# Patient Record
Sex: MALE | Race: BLACK OR AFRICAN AMERICAN
[De-identification: names, ages, dates, MRNs, and addresses within clinical notes are randomized per-mention and may not be internally consistent; named-entity substitution may affect disease eponyms.]

---

## 2021-01-25 ENCOUNTER — HOSPITAL ENCOUNTER (OUTPATIENT)
Dept: HOSPITAL 62 - OD | Age: 41
End: 2021-01-25
Payer: COMMERCIAL

## 2021-01-25 DIAGNOSIS — M16.11: ICD-10-CM

## 2021-01-25 DIAGNOSIS — Z87.891: ICD-10-CM

## 2021-01-25 DIAGNOSIS — Z01.810: ICD-10-CM

## 2021-01-25 DIAGNOSIS — Z01.812: Primary | ICD-10-CM

## 2021-01-25 DIAGNOSIS — Z79.899: ICD-10-CM

## 2021-01-25 DIAGNOSIS — Z01.811: ICD-10-CM

## 2021-01-25 LAB
ADD MANUAL DIFF: NO
ANION GAP SERPL CALC-SCNC: 6 MMOL/L (ref 5–19)
APPEARANCE UR: CLEAR
APTT PPP: YELLOW S
BASOPHILS # BLD AUTO: 0 10^3/UL (ref 0–0.2)
BASOPHILS NFR BLD AUTO: 0.5 % (ref 0–2)
BILIRUB UR QL STRIP: NEGATIVE
BUN SERPL-MCNC: 15 MG/DL (ref 7–20)
CALCIUM: 9.2 MG/DL (ref 8.4–10.2)
CHLORIDE SERPL-SCNC: 104 MMOL/L (ref 98–107)
CO2 SERPL-SCNC: 29 MMOL/L (ref 22–30)
EOSINOPHIL # BLD AUTO: 0.1 10^3/UL (ref 0–0.6)
EOSINOPHIL NFR BLD AUTO: 1.1 % (ref 0–6)
ERYTHROCYTE [DISTWIDTH] IN BLOOD BY AUTOMATED COUNT: 12.7 % (ref 11.5–14)
ERYTHROCYTE [SEDIMENTATION RATE] IN BLOOD: 24 MM/HR (ref 0–15)
GLUCOSE SERPL-MCNC: 108 MG/DL (ref 75–110)
GLUCOSE UR STRIP-MCNC: NEGATIVE MG/DL
HCT VFR BLD CALC: 38.9 % (ref 37.9–51)
HGB BLD-MCNC: 13.1 G/DL (ref 13.5–17)
KETONES UR STRIP-MCNC: NEGATIVE MG/DL
LYMPHOCYTES # BLD AUTO: 2 10^3/UL (ref 0.5–4.7)
LYMPHOCYTES NFR BLD AUTO: 33.3 % (ref 13–45)
MCH RBC QN AUTO: 30.3 PG (ref 27–33.4)
MCHC RBC AUTO-ENTMCNC: 33.7 G/DL (ref 32–36)
MCV RBC AUTO: 90 FL (ref 80–97)
MONOCYTES # BLD AUTO: 0.3 10^3/UL (ref 0.1–1.4)
MONOCYTES NFR BLD AUTO: 5.1 % (ref 3–13)
NEUTROPHILS # BLD AUTO: 3.6 10^3/UL (ref 1.7–8.2)
NEUTS SEG NFR BLD AUTO: 60 % (ref 42–78)
PH UR STRIP: 6 [PH] (ref 5–9)
PLATELET # BLD: 292 10^3/UL (ref 150–450)
POTASSIUM SERPL-SCNC: 4.1 MMOL/L (ref 3.6–5)
PROT UR STRIP-MCNC: NEGATIVE MG/DL
RBC # BLD AUTO: 4.32 10^6/UL (ref 4.35–5.55)
SP GR UR STRIP: 1.03
TOTAL CELLS COUNTED % (AUTO): 100 %
UROBILINOGEN UR-MCNC: NEGATIVE MG/DL (ref ?–2)
WBC # BLD AUTO: 6 10^3/UL (ref 4–10.5)

## 2021-01-25 PROCEDURE — 82040 ASSAY OF SERUM ALBUMIN: CPT

## 2021-01-25 PROCEDURE — 82306 VITAMIN D 25 HYDROXY: CPT

## 2021-01-25 PROCEDURE — 93010 ELECTROCARDIOGRAM REPORT: CPT

## 2021-01-25 PROCEDURE — 83036 HEMOGLOBIN GLYCOSYLATED A1C: CPT

## 2021-01-25 PROCEDURE — 36415 COLL VENOUS BLD VENIPUNCTURE: CPT

## 2021-01-25 PROCEDURE — 85025 COMPLETE CBC W/AUTO DIFF WBC: CPT

## 2021-01-25 PROCEDURE — 93005 ELECTROCARDIOGRAM TRACING: CPT

## 2021-01-25 PROCEDURE — 87070 CULTURE OTHR SPECIMN AEROBIC: CPT

## 2021-01-25 PROCEDURE — 81001 URINALYSIS AUTO W/SCOPE: CPT

## 2021-01-25 PROCEDURE — 80048 BASIC METABOLIC PNL TOTAL CA: CPT

## 2021-01-25 PROCEDURE — 85652 RBC SED RATE AUTOMATED: CPT

## 2021-01-25 PROCEDURE — 86140 C-REACTIVE PROTEIN: CPT

## 2021-01-25 NOTE — EKG REPORT
SEVERITY:- ABNORMAL ECG -

SINUS RHYTHM

VENTRICULAR PREMATURE COMPLEX

NONSPECIFIC T ABNORMALITIES, INFERIOR LEADS

:

Confirmed by: Fco Iyer MD 25-Jan-2021 16:55:11